# Patient Record
Sex: FEMALE | Race: WHITE | NOT HISPANIC OR LATINO | Employment: FULL TIME | URBAN - METROPOLITAN AREA
[De-identification: names, ages, dates, MRNs, and addresses within clinical notes are randomized per-mention and may not be internally consistent; named-entity substitution may affect disease eponyms.]

---

## 2019-04-30 ENCOUNTER — TRANSCRIBE ORDERS (OUTPATIENT)
Dept: ADMINISTRATIVE | Facility: HOSPITAL | Age: 39
End: 2019-04-30

## 2019-04-30 ENCOUNTER — HOSPITAL ENCOUNTER (OUTPATIENT)
Dept: GENERAL RADIOLOGY | Facility: HOSPITAL | Age: 39
Discharge: HOME | End: 2019-04-30
Admitting: FAMILY MEDICINE

## 2019-04-30 ENCOUNTER — LAB (OUTPATIENT)
Dept: LAB | Facility: HOSPITAL | Age: 39
End: 2019-04-30

## 2019-04-30 DIAGNOSIS — J40 BRONCHITIS: ICD-10-CM

## 2019-04-30 DIAGNOSIS — R05.9 COUGH: Primary | ICD-10-CM

## 2019-04-30 DIAGNOSIS — R05.9 COUGH: ICD-10-CM

## 2019-04-30 LAB
B PARAPERT DNA SPEC QL NAA+PROBE: NOT DETECTED
B PERT DNA SPEC QL NAA+PROBE: NOT DETECTED
C PNEUM DNA NPH QL NAA+NON-PROBE: NOT DETECTED
FLUAV H1 2009 PAND RNA NPH QL NAA+PROBE: NOT DETECTED
FLUAV H1 HA GENE NPH QL NAA+PROBE: NOT DETECTED
FLUAV H3 RNA NPH QL NAA+PROBE: NOT DETECTED
FLUAV SUBTYP SPEC NAA+PROBE: NOT DETECTED
FLUBV RNA ISLT QL NAA+PROBE: NOT DETECTED
HADV DNA SPEC NAA+PROBE: NOT DETECTED
HCOV 229E RNA SPEC QL NAA+PROBE: NOT DETECTED
HCOV HKU1 RNA SPEC QL NAA+PROBE: NOT DETECTED
HCOV NL63 RNA SPEC QL NAA+PROBE: NOT DETECTED
HCOV OC43 RNA SPEC QL NAA+PROBE: NOT DETECTED
HMPV RNA NPH QL NAA+NON-PROBE: NOT DETECTED
HPIV1 RNA SPEC QL NAA+PROBE: NOT DETECTED
HPIV2 RNA SPEC QL NAA+PROBE: NOT DETECTED
HPIV3 RNA NPH QL NAA+PROBE: NOT DETECTED
HPIV4 P GENE NPH QL NAA+PROBE: NOT DETECTED
M PNEUMO IGG SER IA-ACNC: NOT DETECTED
RHINOVIRUS RNA SPEC NAA+PROBE: DETECTED
RSV RNA NPH QL NAA+NON-PROBE: NOT DETECTED

## 2019-04-30 PROCEDURE — 71046 X-RAY EXAM CHEST 2 VIEWS: CPT

## 2019-04-30 PROCEDURE — 87581 M.PNEUMON DNA AMP PROBE: CPT

## 2019-04-30 PROCEDURE — 87633 RESP VIRUS 12-25 TARGETS: CPT

## 2019-04-30 PROCEDURE — 87486 CHLMYD PNEUM DNA AMP PROBE: CPT

## 2019-04-30 PROCEDURE — 87798 DETECT AGENT NOS DNA AMP: CPT

## 2021-09-29 ENCOUNTER — OFFICE VISIT (OUTPATIENT)
Dept: ORTHOPEDIC SURGERY | Facility: CLINIC | Age: 41
End: 2021-09-29

## 2021-09-29 VITALS
HEIGHT: 64 IN | DIASTOLIC BLOOD PRESSURE: 72 MMHG | WEIGHT: 143 LBS | BODY MASS INDEX: 24.41 KG/M2 | SYSTOLIC BLOOD PRESSURE: 112 MMHG | HEART RATE: 73 BPM

## 2021-09-29 DIAGNOSIS — M75.41 SUBACROMIAL IMPINGEMENT OF RIGHT SHOULDER: ICD-10-CM

## 2021-09-29 DIAGNOSIS — M75.81 TENDINITIS OF RIGHT ROTATOR CUFF: ICD-10-CM

## 2021-09-29 DIAGNOSIS — M25.511 ACUTE PAIN OF RIGHT SHOULDER: Primary | ICD-10-CM

## 2021-09-29 PROCEDURE — 99203 OFFICE O/P NEW LOW 30 MIN: CPT | Performed by: ORTHOPAEDIC SURGERY

## 2021-09-29 PROCEDURE — 73030 X-RAY EXAM OF SHOULDER: CPT | Performed by: ORTHOPAEDIC SURGERY

## 2021-09-29 RX ORDER — ESCITALOPRAM OXALATE 10 MG/1
10 TABLET ORAL DAILY
COMMUNITY

## 2021-09-29 RX ORDER — METHYLPREDNISOLONE 4 MG/1
TABLET ORAL
Qty: 1 EACH | Refills: 0 | Status: SHIPPED | OUTPATIENT
Start: 2021-09-29 | End: 2021-10-25

## 2021-09-29 RX ORDER — MELOXICAM 15 MG/1
15 TABLET ORAL DAILY
Qty: 30 TABLET | Refills: 0 | Status: SHIPPED | OUTPATIENT
Start: 2021-09-29 | End: 2021-11-02

## 2021-10-25 ENCOUNTER — OFFICE VISIT (OUTPATIENT)
Dept: ORTHOPEDIC SURGERY | Facility: CLINIC | Age: 41
End: 2021-10-25

## 2021-10-25 VITALS — HEIGHT: 64 IN | WEIGHT: 143 LBS | BODY MASS INDEX: 24.41 KG/M2

## 2021-10-25 DIAGNOSIS — M75.81 TENDINITIS OF RIGHT ROTATOR CUFF: ICD-10-CM

## 2021-10-25 DIAGNOSIS — M75.41 SUBACROMIAL IMPINGEMENT OF RIGHT SHOULDER: Primary | ICD-10-CM

## 2021-10-25 PROCEDURE — 99213 OFFICE O/P EST LOW 20 MIN: CPT | Performed by: ORTHOPAEDIC SURGERY

## 2021-10-25 NOTE — PROGRESS NOTES
"Subjective:     Patient ID: Nena Hylton is a 41 y.o. female.    Chief Complaint:  Follow-up right shoulder pain, subacromial bursitis, rotator cuff tendinitis  Plan last visit-Medrol Dosepak followed by meloxicam  History of Present Illness  Nena Hylton returns to clinic today for evaluation of right shoulder pain. She states that the meloxicam do not always provide her relief and she continues to have pain with certain movements and activities. The pain is localized on the lateral aspect of the right shoulder and she has radiation down her right upper extremity in to her long finger finger, index finger, and thumb. She denies any tingling or numbness. She describes the pain as sharp, intense, lasts temporarily, and is 7 to 8 out of 10 in severity; however, she has constant aching, which she rates at a 3 to 4 out of 10 in severity. She states that when the sharp, intense pain is present, she wants to drop whatever she is holding. She denies doing any throwing with her right upper extremity. She states that the previous steroid injections her some relief higher than 10 percent, but lower than 50 percent. She states that the pain is exacerbated with \"coming across\" and feels as if she really has to \"push.\" She reports that she does stretches; however, her right shoulder continues to have difficulty moving and rotating correctly. She states that she would like to defer a cortisone injection for as long as possible.        Social History     Occupational History   • Not on file   Tobacco Use   • Smoking status: Former Smoker     Quit date:      Years since quittin.8   • Smokeless tobacco: Never Used   Vaping Use   • Vaping Use: Never used   Substance and Sexual Activity   • Alcohol use: Not Currently   • Drug use: Defer   • Sexual activity: Defer      Past Medical History:   Diagnosis Date   • Depression      History reviewed. No pertinent surgical history.    History reviewed. No pertinent family " "history.      Review of Systems        Objective:  Vitals:    10/25/21 0823   Weight: 64.9 kg (143 lb)   Height: 162.6 cm (64\")         10/25/21  0823   Weight: 64.9 kg (143 lb)     Body mass index is 24.55 kg/m².  General: No acute distress.  Resp: normal respiratory effort  Skin: no rashes or wounds; normal turgor  Psych: mood and affect appropriate; recent and remote memory intact          Ortho Exam     Right shoulder-  FF-   Active- 160  Strength- 4/5  ER-      Active- 70  Strength- 4/5    Belly press test- 4+/5  Significant tenderness over the anterior and posterior joint line, increased with cross-arm testing.   No significant tenderness over the AC joint  Positive sensation light touch all distributions right hand symmetric to left, brisk cap refill all digits, 2+ radial pulse right wrist.    Imaging:  None today  Assessment:        1. Subacromial impingement of right shoulder    2. Tendinitis of right rotator cuff           Plan:          1. Discussed treatment options at length with patient at today's visit. Given persistent pain despite medication treatment, we will proceed with an MRI of the right shoulder to evaluate any underlying structural issues.  2. Follow-up: She will follow up to review the results of the MRI and discuss her treatment options.  3. The patient is in agreement and all questions were answered.       Nena Warner was in agreement with plan and had all questions answered.     Orders:  Orders Placed This Encounter   Procedures   • MRI Shoulder Right Without Contrast       Medications:  No orders of the defined types were placed in this encounter.      Followup:  Return for review of MRI results.    Diagnoses and all orders for this visit:    1. Subacromial impingement of right shoulder (Primary)  -     MRI Shoulder Right Without Contrast; Future    2. Tendinitis of right rotator cuff  -     MRI Shoulder Right Without Contrast; Future          Dictated utilizing Dragon dictation "     Transcribed from ambient dictation for Byron Glass MD by Bar Chen.  10/25/21   09:58 EDT    I have personally performed the services described in this document as transcribed by the above individual, and it is both accurate and complete.  Byron Glass MD  10/25/2021  12:39 EDT

## 2021-11-01 NOTE — TELEPHONE ENCOUNTER
RX Refill request.    1. Subacromial impingement of right shoulder    2. Tendinitis of right rotator cuff      Last seen 10.25.2021

## 2021-11-02 RX ORDER — MELOXICAM 15 MG/1
TABLET ORAL
Qty: 30 TABLET | Refills: 0 | Status: SHIPPED | OUTPATIENT
Start: 2021-11-02 | End: 2021-12-08

## 2021-11-10 ENCOUNTER — HOSPITAL ENCOUNTER (OUTPATIENT)
Dept: MRI IMAGING | Facility: HOSPITAL | Age: 41
Discharge: HOME | End: 2021-11-10
Admitting: ORTHOPAEDIC SURGERY

## 2021-11-10 DIAGNOSIS — M75.41 SUBACROMIAL IMPINGEMENT OF RIGHT SHOULDER: ICD-10-CM

## 2021-11-10 DIAGNOSIS — M75.81 TENDINITIS OF RIGHT ROTATOR CUFF: ICD-10-CM

## 2021-11-10 PROCEDURE — 73221 MRI JOINT UPR EXTREM W/O DYE: CPT

## 2021-12-08 RX ORDER — MELOXICAM 15 MG/1
TABLET ORAL
Qty: 30 TABLET | Refills: 0 | Status: SHIPPED | OUTPATIENT
Start: 2021-12-08 | End: 2022-01-07

## 2021-12-16 ENCOUNTER — OFFICE VISIT (OUTPATIENT)
Dept: ORTHOPEDIC SURGERY | Facility: CLINIC | Age: 41
End: 2021-12-16

## 2021-12-16 VITALS — WEIGHT: 145 LBS | BODY MASS INDEX: 24.75 KG/M2 | HEIGHT: 64 IN

## 2021-12-16 DIAGNOSIS — M75.81 TENDINITIS OF RIGHT ROTATOR CUFF: Primary | ICD-10-CM

## 2021-12-16 DIAGNOSIS — M75.41 SUBACROMIAL IMPINGEMENT OF RIGHT SHOULDER: ICD-10-CM

## 2021-12-16 PROCEDURE — 99213 OFFICE O/P EST LOW 20 MIN: CPT | Performed by: ORTHOPAEDIC SURGERY

## 2021-12-16 NOTE — PROGRESS NOTES
"Subjective:     Patient ID: Nena Hylton is a 41 y.o. female.    Chief Complaint:  Follow-up right shoulder pain, subacromial bursitis, rotator cuff tendinitis    History of Present Illness   Nena Hylton returns to clinic today for evaluation of right shoulder pain.    The patient reports that her right shoulder is doing okay. The patient states that she does not take her meloxicam daily. The patient notes that she is intimidated by the injections. She denies much effect from the 5-day Medrol dosepak. The patient does not want to try the oral steroid at this time. She denies getting any information about exercises at her last visit. The patient states that her residual pain at this point in time is a 5-6 out of 10. She has continued to see some improvement with meloxicam. She localizes her pain in the lateral aspect of her right shoulder, in the subacromial space. She denies any associated numbness or tingling. The patient does note some radiation down the lateral arm still at this point in time, worse at night, and with overhead lifting and resistance.      Social History     Occupational History   • Not on file   Tobacco Use   • Smoking status: Former Smoker     Quit date:      Years since quittin.9   • Smokeless tobacco: Never Used   Vaping Use   • Vaping Use: Never used   Substance and Sexual Activity   • Alcohol use: Not Currently   • Drug use: Defer   • Sexual activity: Defer      Past Medical History:   Diagnosis Date   • Depression      History reviewed. No pertinent surgical history.    History reviewed. No pertinent family history.      Review of Systems   Musculoskeletal: Positive for arthralgias and myalgias.           Objective:  Vitals:    21 0754   Weight: 65.8 kg (145 lb)   Height: 162.6 cm (64\")         21  0754   Weight: 65.8 kg (145 lb)     Body mass index is 24.89 kg/m².  Physical Exam    Vital signs reviewed.   General: No acute distress, alert and oriented  Eyes: " conjunctiva clear; pupils equally round and reactive  ENT: external ears and nose atraumatic; oropharynx clear  CV: no peripheral edema  Resp: normal respiratory effort  Skin: no rashes or wounds; normal turgor  Psych: mood and affect appropriate; recent and remote memory intact          Ortho Exam       Right shoulder-  FF-       Active-165  Strength- 4+/5  ER-      Active- 70  Strength- 4/5     Belly press test- 4+/5  Maximal tenderness over the anterior and posterior joint line, increased with cross-arm testing.   No significant tenderness over the AC joint  Moderately positive Lawton and Neer's, moderately positive empty can test, negative drop arm test, negative X rotation lag sign  Positive sensation light touch all distributions right hand symmetric to left, brisk cap refill all digits, 2+ radial pulse right wrist.    Imaging:    MRI Shoulder RT WO    INDICATION:   Diffuse right shoulder pain radiating down the right arm for 6 months. No specific injury. No prior right shoulder surgery.    TECHNIQUE:   MRI of the right shoulder without IV contrast.    COMPARISON:   None available.    FINDINGS:   Rotator cuff and biceps tendon:  There is mild supraspinatus and infraspinatus tendinopathy. No evidence of tear. Moderate subscapularis tendinopathy without high-grade tear. Teres minor tendon intact. The long head biceps tendon is intact and well positioned in the bicipital groove.    Glenoid labrum and Glenohumeral joint:   There is no displaced labral tear. Glenohumeral articular cartilage appears intact. No significant glenohumeral effusion.    AC joint:   No significant degenerative change of the acromioclavicular joint. No subacromial spur. No evidence of significant mass effect on the supraspinatus outlet. Mild inflammation of the subacromial/subdeltoid bursa.    Bones and Musculature:   Bone marrow signal is within normal limits. Visualized musculature is unremarkable.   Report Conclusions  IMPRESSION:      1. Mild supraspinatus and infraspinatus tendinopathy. Moderate subscapularis tendinopathy. No high-grade rotator cuff tear.  2. No acute labral pathology.  3. No evidence of significant subacromial impingement.  4. Mild inflammation of the subacromial/subdeltoid bursa.        Signer Name: Lele Yu MD  Signed: 11/11/2021 2:55 PM  Workstation Name: [email or web address]   [facility]     Review outside MRI right shoulder including review of images as well as radiology report indicates moderate supraspinatus infraspinatus tendinopathy with no evidence of labral pathology, mild inflammation of subacromial bursa noted.    Assessment:        1. Tendinitis of right rotator cuff    2. Subacromial impingement of right shoulder           Plan:          1. Discussed treatment options at length with patient at today's visit.   2. The patient would like to review injections options and we will continue in the meantime with her meloxicam.   3. I have given her basic exercises for her right shoulder to work on strengthening and range of motion. May consider subacromial injection if her improvement slows down, or prednisone taper.       Nena Hylton was in agreement with plan and had all questions answered.     Orders:  No orders of the defined types were placed in this encounter.      Medications:  No orders of the defined types were placed in this encounter.      Followup:  Return if symptoms worsen or fail to improve.    Diagnoses and all orders for this visit:    1. Tendinitis of right rotator cuff (Primary)    2. Subacromial impingement of right shoulder          Dictated utilizing Dragon dictation     Transcribed from ambient dictation for Byron Glass MD by Tracee Lou.  12/16/21   17:25 EST    Patient verbalized consent to the visit recording.  I have personally performed the services described in this document as transcribed by the above individual, and it is both accurate and complete.   Byron Glass MD  12/29/2021  21:21 EST

## 2022-01-07 RX ORDER — MELOXICAM 15 MG/1
TABLET ORAL
Qty: 30 TABLET | Refills: 0 | Status: SHIPPED | OUTPATIENT
Start: 2022-01-07 | End: 2022-02-07

## 2022-01-07 NOTE — TELEPHONE ENCOUNTER
RX Refill request.    1. Tendinitis of right rotator cuff    2. Subacromial impingement of right shoulder      Last filled 12.16.2021

## 2022-02-07 RX ORDER — MELOXICAM 15 MG/1
TABLET ORAL
Qty: 30 TABLET | Refills: 0 | Status: SHIPPED | OUTPATIENT
Start: 2022-02-07 | End: 2022-03-10

## 2022-02-07 NOTE — TELEPHONE ENCOUNTER
Rx Refill Note  Requested Prescriptions     Pending Prescriptions Disp Refills    meloxicam (MOBIC) 15 MG tablet [Pharmacy Med Name: MELOXICAM 15 MG TABLET] 30 tablet 0     Sig: TAKE ONE TABLET BY MOUTH DAILY      Last office visit with prescribing clinician: 12/16/2021      Next office visit with prescribing clinician: Visit date not found   Last Filled: 12.8.21      Assessment       1. Tendinitis of right rotator cuff    2. Subacromial impingement of right shoulder       Date of Surgery: n/a      Jennifer Huerta MA  02/07/22, 07:51 EST    {TIP  Encounters:    {TIP  Please add Last Relevant Lab Date if appropriate:  {TIP  Is Refill Pharmacy correct?:

## 2022-03-10 RX ORDER — MELOXICAM 15 MG/1
TABLET ORAL
Qty: 30 TABLET | Refills: 0 | Status: SHIPPED | OUTPATIENT
Start: 2022-03-10

## 2022-03-10 NOTE — TELEPHONE ENCOUNTER
Rx Refill Note  Requested Prescriptions     Pending Prescriptions Disp Refills    meloxicam (MOBIC) 15 MG tablet [Pharmacy Med Name: MELOXICAM 15 MG TABLET] 30 tablet 0     Sig: TAKE ONE TABLET BY MOUTH DAILY      Last office visit with prescribing clinician: 12/16/2021      Next office visit with prescribing clinician: Visit date not found   Last Filled:02.07.2022    1. Tendinitis of right rotator cuff    2. Subacromial impingement of right shoulder             Tammy Lantigua MA  03/10/22, 10:39 EST    {TIP  Encounters:    {TIP  Please add Last Relevant Lab Date if appropriate:  {TIP  Is Refill Pharmacy correct?:

## 2022-09-28 ENCOUNTER — TRANSCRIBE ORDERS (OUTPATIENT)
Dept: ADMINISTRATIVE | Facility: HOSPITAL | Age: 42
End: 2022-09-28

## 2022-09-28 DIAGNOSIS — K21.9 CHALASIA OF LOWER ESOPHAGEAL SPHINCTER: Primary | ICD-10-CM

## 2022-09-28 DIAGNOSIS — R10.9 STOMACH ACHE: ICD-10-CM

## 2022-10-04 ENCOUNTER — HOSPITAL ENCOUNTER (OUTPATIENT)
Dept: ULTRASOUND IMAGING | Facility: HOSPITAL | Age: 42
Discharge: HOME | End: 2022-10-04
Admitting: FAMILY MEDICINE

## 2022-10-04 DIAGNOSIS — K21.9 CHALASIA OF LOWER ESOPHAGEAL SPHINCTER: ICD-10-CM

## 2022-10-04 DIAGNOSIS — R10.9 STOMACH ACHE: ICD-10-CM

## 2022-10-04 PROCEDURE — 76700 US EXAM ABDOM COMPLETE: CPT

## 2025-06-05 ENCOUNTER — HOSPITAL ENCOUNTER (OUTPATIENT)
Dept: GENERAL RADIOLOGY | Facility: HOSPITAL | Age: 45
Discharge: HOME | End: 2025-06-05
Payer: COMMERCIAL

## 2025-06-05 ENCOUNTER — TRANSCRIBE ORDERS (OUTPATIENT)
Dept: ADMINISTRATIVE | Facility: HOSPITAL | Age: 45
End: 2025-06-05
Payer: COMMERCIAL

## 2025-06-05 DIAGNOSIS — M54.2 CERVICALGIA: ICD-10-CM

## 2025-06-05 DIAGNOSIS — J40 BRONCHITIS: ICD-10-CM

## 2025-06-05 DIAGNOSIS — J40 BRONCHITIS: Primary | ICD-10-CM

## 2025-06-05 PROCEDURE — 72040 X-RAY EXAM NECK SPINE 2-3 VW: CPT

## 2025-06-05 PROCEDURE — 71046 X-RAY EXAM CHEST 2 VIEWS: CPT

## 2025-08-01 ENCOUNTER — TRANSCRIBE ORDERS (OUTPATIENT)
Dept: ADMINISTRATIVE | Facility: HOSPITAL | Age: 45
End: 2025-08-01
Payer: COMMERCIAL

## 2025-08-01 DIAGNOSIS — R20.0 NUMBNESS AND TINGLING OF FOOT: Primary | ICD-10-CM

## 2025-08-01 DIAGNOSIS — R20.2 NUMBNESS AND TINGLING OF FOOT: Primary | ICD-10-CM
